# Patient Record
Sex: MALE | Race: BLACK OR AFRICAN AMERICAN | NOT HISPANIC OR LATINO
[De-identification: names, ages, dates, MRNs, and addresses within clinical notes are randomized per-mention and may not be internally consistent; named-entity substitution may affect disease eponyms.]

---

## 2021-02-05 PROBLEM — Z00.00 ENCOUNTER FOR PREVENTIVE HEALTH EXAMINATION: Status: ACTIVE | Noted: 2021-02-05

## 2021-03-18 ENCOUNTER — APPOINTMENT (OUTPATIENT)
Dept: PULMONOLOGY | Facility: CLINIC | Age: 56
End: 2021-03-18
Payer: COMMERCIAL

## 2021-03-18 DIAGNOSIS — Z86.79 PERSONAL HISTORY OF OTHER DISEASES OF THE CIRCULATORY SYSTEM: ICD-10-CM

## 2021-03-18 PROCEDURE — 99204 OFFICE O/P NEW MOD 45 MIN: CPT | Mod: GC,95

## 2021-03-18 NOTE — REASON FOR VISIT
[Initial Evaluation] : an initial evaluation [Home] : at home, [unfilled] , at the time of the visit. [Medical Office: ([facility])___] : at the medical office located in  [Verbal consent obtained from patient] : the patient, [unfilled]

## 2021-03-19 NOTE — HISTORY OF PRESENT ILLNESS
[FreeTextEntry1] : 55M hx HTN, DILCIA, who comes for initial evaluation. Referred by Dr. Vicente. Pt states that he was first diagnosed with sleep apnea 10 years ago and had snoring, EDS. He was told that he had severe sleep apnea (AHI 54/hr) and subsequently had CPAP titration study. Currently states that he uses CPAP consistently, has ResMed through [facility]. However even with consistent CPAP use, he feels sleepy during the day. 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 No hypersomnolence prior to diagnosis of DILCIA. \par  \par Meds: irbesartan\par  \par ESS: 21\par \par CPAP titration ([facility]) 1/25/2018: CPAP 11 cmH2O w/ nasal mask w/AHI 0.3

## 2021-03-19 NOTE — REVIEW OF SYSTEMS
[EDS: ESS=____] : daytime somnolence: ESS=[unfilled] [Fatigue] : fatigue [Snoring] : snoring [Difficulty Maintaining Sleep] : difficulty maintaining sleep [Negative] : Psychiatric [Witnessed Apneas] : no witnessed apnea [A.M. Headache] : no headache present upon awakening [Leg Dysesthesias] : no leg dysesthesias [Difficulty Initiating Sleep] : no difficulty falling asleep [Lower Extremity Discomfort] : no lower extremity discomfort [Late day/ Evening symptoms] : no late day/evening symptoms [Unusual Sleep Behavior] : no unusual sleep behavior [Hypersomnolence] : not sleeping much more than usual [Cataplexy] :  no cataplexy

## 2021-03-19 NOTE — ASSESSMENT
[FreeTextEntry1] : 55M hx HTN, DILCIA, who comes for initial evaluation. Pt has hx of DILCIA and treated with CPAP. Pt uses ResMed PAP machine at home, unsure of settings. Pt states that he is using CPAP nightly and continues to feel extremely sleepy during the day with ESS today of 21. [facility] to [facility] from PAP therapy. Will try to obtain old PSG. If pt is compliant and continues to still have EDS, may require additional studies to evaluate. \par \par \par Addendum:  pts cpap device now linked to [employer] - data available after patients visit\par usage 4 hr 56 min on average at cpap 11 cmH20 with therapy showed ahi at 0.4/hr.  mask leakage in acceptable range. will review with pt to increase sleep time as insufficient sleep time along with shift work sleep disorder are likley contributing factors to his persistent eds despite pap therapy.

## 2021-07-06 ENCOUNTER — APPOINTMENT (OUTPATIENT)
Dept: PULMONOLOGY | Facility: CLINIC | Age: 56
End: 2021-07-06
Payer: COMMERCIAL

## 2021-07-06 VITALS
WEIGHT: 244.5 LBS | SYSTOLIC BLOOD PRESSURE: 168 MMHG | TEMPERATURE: 98 F | OXYGEN SATURATION: 98 % | RESPIRATION RATE: 16 BRPM | DIASTOLIC BLOOD PRESSURE: 93 MMHG | HEART RATE: 64 BPM

## 2021-07-06 DIAGNOSIS — Z85.038 PERSONAL HISTORY OF OTHER MALIGNANT NEOPLASM OF LARGE INTESTINE: ICD-10-CM

## 2021-07-06 PROCEDURE — 99072 ADDL SUPL MATRL&STAF TM PHE: CPT

## 2021-07-06 PROCEDURE — 99214 OFFICE O/P EST MOD 30 MIN: CPT | Mod: GC

## 2021-07-06 NOTE — PHYSICAL EXAM
[General Appearance - Well Developed] : well developed [General Appearance - Well Nourished] : well nourished [Normal Conjunctiva] : the conjunctiva exhibited no abnormalities [Normal Oropharynx] : normal oropharynx [Neck Appearance] : the appearance of the neck was normal [Jugular Venous Distention Increased] : there was no jugular-venous distention [Heart Rate And Rhythm] : heart rate was normal and rhythm regular [] : no respiratory distress [Abdomen Soft] : soft [Abdomen Tenderness] : non-tender [Nail Clubbing] : no clubbing of the fingernails [Cyanosis, Localized] : no localized cyanosis [Oriented To Time, Place, And Person] : oriented to person, place, and time [Impaired Insight] : insight and judgment were intact [Mood] : the mood was normal

## 2021-07-06 NOTE — REVIEW OF SYSTEMS
[EDS: ESS=____] : daytime somnolence: ESS=[unfilled] [Fatigue] : fatigue [Snoring] : snoring [Difficulty Maintaining Sleep] : difficulty maintaining sleep [Negative] : Psychiatric [Witnessed Apneas] : no witnessed apnea [Leg Dysesthesias] : no leg dysesthesias [A.M. Headache] : no headache present upon awakening [Difficulty Initiating Sleep] : no difficulty falling asleep [Lower Extremity Discomfort] : no lower extremity discomfort [Late day/ Evening symptoms] : no late day/evening symptoms [Unusual Sleep Behavior] : no unusual sleep behavior [Hypersomnolence] : not sleeping much more than usual [Cataplexy] :  no cataplexy

## 2021-07-06 NOTE — HISTORY OF PRESENT ILLNESS
[FreeTextEntry1] : 56 yo M with DILCIA, HTN, colon CA (s/p resection in 2020, in remission(?) no chemo/rad), here for follow up for EDS. 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 He goes to bed from 10:00pm - 2:30 on average and has no issues with sleep onset or maintenance. He also reports consistent CPAP use during this time. He occasionally takes naps after his shift lasting 2-3 hours on his work days. On his days off, he sleeps ~8hours and feels refreshed most of the time.\par He drives to and from work and stated that he sometimes has bouts of sleepiness, and has to stop on the side to take short naps. \par No caffeine use, no alcohol, smoking, or illicit drug use.\par \par Meds: irbesartan\par  \par ESS: 16\par \par CPAP titration ([facility]) 1/25/2018: CPAP 11 cmH2O w/ nasal mask w/AHI 0.3

## 2021-07-06 NOTE — REASON FOR VISIT
[Initial Evaluation] : an initial evaluation [Home] : at home, [unfilled] , at the time of the visit. [Medical Office: ([facility])___] : at the medical office located in  [Verbal consent obtained from patient] : the patient, [unfilled]

## 2021-07-06 NOTE — ASSESSMENT
[FreeTextEntry1] : 55M hx HTN, Colon CA, DILCIA, who comes for follow up evaluation. CPAP usage shows adequate use every night, with residual AHI <1. Despite that, he still has EDS. This is likely due to accumulated sleep debt and ongoing insufficient sleep time. We have recommended to have a consistent bimodal sleep schedule (right after shift and at night) and aim for 6-7hrs. of sleep every day. \par He has bouts of sleepiness with driving, and is indicated for stimulants given his shift worker sleep disorder. However, this is limited by his uncontrolled hypertension (on repeat measurement, 182/98). We have recommended him to return to his PCP to have better control of his blood pressure. If his symptoms persist at that point, plan on starting modafinil or armodafinil during his work days. in the interim we have advised on behavioral measures to improve alertness such as scheduled naps and protecting sleep time after his shift.  he will also look into potential options to change his work schedule.  We have provided full counseling on dangers and precautions of driving with his condition. The dangers of drowsy driving were discussed with the patient.  The patient was warned to avoid drowsy driving. The patient is benefiting from PAP therapy and will continue nightly use as prescribed.

## 2022-08-17 ENCOUNTER — APPOINTMENT (OUTPATIENT)
Dept: PULMONOLOGY | Facility: CLINIC | Age: 57
End: 2022-08-17

## 2022-08-17 VITALS
OXYGEN SATURATION: 98 % | WEIGHT: 253.38 LBS | DIASTOLIC BLOOD PRESSURE: 84 MMHG | RESPIRATION RATE: 16 BRPM | HEART RATE: 73 BPM | TEMPERATURE: 98 F | SYSTOLIC BLOOD PRESSURE: 153 MMHG

## 2022-08-17 DIAGNOSIS — G47.33 OBSTRUCTIVE SLEEP APNEA (ADULT) (PEDIATRIC): ICD-10-CM

## 2022-08-17 DIAGNOSIS — G47.14 HYPERSOMNIA DUE TO MEDICAL CONDITION: ICD-10-CM

## 2022-08-17 DIAGNOSIS — G47.26 CIRCADIAN RHYTHM SLEEP DISORDER, SHIFT WORK TYPE: ICD-10-CM

## 2022-08-17 PROCEDURE — 99213 OFFICE O/P EST LOW 20 MIN: CPT | Mod: GC

## 2022-08-17 RX ORDER — IRBESARTAN 300 MG/1
TABLET ORAL
Refills: 0 | Status: COMPLETED | COMMUNITY
End: 2022-08-17

## 2022-08-17 RX ORDER — NIFEDIPINE 30 MG/1
30 TABLET, EXTENDED RELEASE ORAL
Refills: 0 | Status: ACTIVE | COMMUNITY
Start: 2022-08-17

## 2022-08-17 RX ORDER — ARMODAFINIL 150 MG/1
150 TABLET ORAL DAILY
Qty: 30 | Refills: 0 | Status: ACTIVE | COMMUNITY
Start: 2021-07-06 | End: 1900-01-01

## 2022-08-17 NOTE — REVIEW OF SYSTEMS
[EDS: ESS=____] : daytime somnolence: ESS=[unfilled] [Fatigue] : fatigue [Snoring] : snoring [Negative] : Psychiatric [Witnessed Apneas] : no witnessed apnea [A.M. Headache] : no headache present upon awakening [Leg Dysesthesias] : no leg dysesthesias [Difficulty Initiating Sleep] : no difficulty falling asleep [Lower Extremity Discomfort] : no lower extremity discomfort [Late day/ Evening symptoms] : no late day/evening symptoms [Unusual Sleep Behavior] : no unusual sleep behavior [Hypersomnolence] : not sleeping much more than usual [Cataplexy] :  no cataplexy

## 2022-08-17 NOTE — ASSESSMENT
[FreeTextEntry1] : 55M hx HTN, Colon CA, DILCIA, who comes for follow up evaluation. CPAP usage shows adequate use every night, with residual AHI <1. Despite that, he still has EDS. This is likely due to accumulated sleep debt and ongoing insufficient sleep time.\par \par 1) DILCIA with excessive daytime sleepiness due to inadequate sleep time and shift work disorder - He is using his machine nightly and continues to derive benefit from use. He will continue to use the machine on a nightly basis. His excessive sleepiness during the day is likely a result of inadequate sleep time during the work week (he averages about 5 hours during work weeks) which is likely related to his early shift. When he is allowed to sleep on the weekends, he will generally sleep 6-7 hours, and his bedtime is much later. He was advised to try and regularize his sleep schedule and to try and increase his total sleep time to at least 6 hours during the work week. He will be prescribed armodafinil for excessive sleepiness during the day, and was instructed to start with 1/2 pill for the first 3 days to minimize side effects prior to starting the full dose. We have also sent a prescription for a new CPAP machine as his current machine is >5 years old.\par \par He will follow up in several weeks to discuss how the medication is working for him.

## 2022-08-17 NOTE — HISTORY OF PRESENT ILLNESS
[FreeTextEntry1] : 56 yo M with DILCIA, HTN, colon CA (s/p resection in 2020 in remission, here for follow up. Last seen on 7/6/2021. At the time, he was complaining of excessive sleepiness and was prescribed armodafinil, however it was never filled.\par \par Patient reports he has been doing about the same since last visit. He never started armodafinil because he was supposed to follow up with his PCP regarding drug interactions with his blood pressure medication, however he admits that he never did. He still report that he feels very sleepy during the day. There are times during the day when he has to take naps in order to get through until nighttime. He says that his normal sleep hours during work days are 9 pm to 2 am. He generally gets to work around 4:45 am. When he sleeps, he reports he sleeps through the night without difficulty. On weekends, he reports he sleeps from 12-1 am to 7-8 am, and generally feels more refreshed throughout the day.\par \par He reports nightly usage of his CPAP device and is deriving benefit from its use. Also reports that he has recently switched his blood pressure medication from irbesartan to nifedipine. This was done because he was recently hospitalized with pancreatitis and it was felt that the irbesartan could have contributed to that.\par \par Meds: nifedipine 30 mg bid\par \par Machine: ResMed AirSense 10 AutoSet CPAP 11 cm H2O\par DME: [facility]\par \par PAP compliance: 26/30 days (87%) with an average nightly usage of 4 hours and 53 minutes, residual AHI 0.5/hr\par \par Sleep Studies:\par CPAP titration ([facility]) 1/25/2018: CPAP 11 cmH2O w/ nasal mask w/AHI 0.3

## 2022-08-18 ENCOUNTER — NON-APPOINTMENT (OUTPATIENT)
Age: 57
End: 2022-08-18

## 2024-07-25 ENCOUNTER — APPOINTMENT (OUTPATIENT)
Dept: PULMONOLOGY | Facility: CLINIC | Age: 59
End: 2024-07-25
Payer: COMMERCIAL

## 2024-07-25 VITALS
HEART RATE: 66 BPM | RESPIRATION RATE: 15 BRPM | BODY MASS INDEX: 36.4 KG/M2 | OXYGEN SATURATION: 95 % | SYSTOLIC BLOOD PRESSURE: 168 MMHG | HEIGHT: 71 IN | WEIGHT: 260 LBS | TEMPERATURE: 98.1 F | DIASTOLIC BLOOD PRESSURE: 91 MMHG

## 2024-07-25 DIAGNOSIS — R06.02 SHORTNESS OF BREATH: ICD-10-CM

## 2024-07-25 DIAGNOSIS — G47.33 OBSTRUCTIVE SLEEP APNEA (ADULT) (PEDIATRIC): ICD-10-CM

## 2024-07-25 PROCEDURE — G2211 COMPLEX E/M VISIT ADD ON: CPT | Mod: NC

## 2024-07-25 PROCEDURE — 99214 OFFICE O/P EST MOD 30 MIN: CPT | Mod: GC

## 2024-07-26 RX ORDER — ARMODAFINIL 150 MG/1
150 TABLET ORAL
Qty: 30 | Refills: 0 | Status: ACTIVE | COMMUNITY
Start: 2024-07-25 | End: 1900-01-01

## 2024-07-26 NOTE — HISTORY OF PRESENT ILLNESS
[Obstructive Sleep Apnea] : obstructive sleep apnea [FreeTextEntry1] : Mr. Soliman 58 HTN, colon cancer, class II obesity, severe DILCIA (AHI 52) who presents for follow up.     His last clinic visit was 8/17/22- concerned about excessive daytime sleepiness despite being compliant with CPAP and armodafinil was recommended. He did not initiate the medication and was lost to follow up for two years.  Today he presents because he is noticing worsening dyspnea on exertion over the past two years in addition to persistent excessive daytime sleepiness (ESS 18). Although he can climb a flight of stairs without stopping to breathe, he is often very winded and fatigued by the end. He endorses intermittent bilateral leg swelling. Otherwise denies associated cough, sputum production, chest pain, or any history of childhood asthma or prior smoking. Weight trend has demonstrated about 20 lb weight gain since his prior visit 2 years ago.   Compliance report from 6/25-7/24/24 demonstrates 87% compliance with 11cm H2O CPAP, residual AHI 0.9, average usage 5 hrs 52 min per night. Feels his sleep quality is good on the nights he uses CPAP. Has cessation of snoring and nighttime wakening with gasping. He sleeps about 6-7 hours per night from 12 AM- 7 AM and has a more stable work schedule from 12-7 PM. He sometimes feels refreshed after a night's rest but not all the time and usually has significant daytime sleepiness (ESS18) which is concerning to him.  He did not try the modafinil yet.   Lastly he is afraid of becoming dependent on CPAP and finds it cumbersome despite having better sleep quality and wanted to explore other options to treat DILCIA.   EPWORTH SLEEPINESS SCALE How likely are you to doze off or fall asleep in the situations described below, in contrast to feeling just tired? This refers to your usual way of life in recent times. Even if you haven't done some of these things recently, try to work out how they would have affected you. Use the following scale to choose one most appropriate number for each situation.   Chance of dozing.............Situation 3........................................Sitting and reading 1........................................Watching TV 2........................................Sitting inactive in a public place (eg a theatre or a meeting) 2........................................As a passenger in a car for an hour without a break 3........................................Lying down to rest in the afternoon when circumstances permit 2........................................Sitting and talking to someone 3........................................Sitting quietly after lunch without alcohol 2........................................In a car, while stopped for a few minutes in traffic   18........................................TOTAL SCORE   0 = Never would doze 1 = Slight chance of dozing 2 = Moderate chance of dozing 3 = High chance of dozing  0-10 normal 10-12 borderline 12-24 abnormal ___________________________________

## 2024-07-26 NOTE — ASSESSMENT
[FreeTextEntry1] : Mr. Soliman 58 HTN, colon cancer, class II obesity, severe DILCIA (AHI 52), excessive daytime somnolence, who presents for follow up. Compliance report from 6/25-7/24/24 demonstrates 87% compliance with 11cm H2O CPAP, residual AHI 0.9, with an average usage time of 5h52 min. He has noticed both subjective and objective improvement with PAP therapy and should continue to use it.   He expressed desire to stop using CPAP because he finds it cumbersome.  We discussed significant weight loss in a healthy manner with exercise, diet change, and medication which can be prescribed by his primary care doctor to achieve this goal. He is interested in trying a mandibular advancement device as an alternative mode of treatment with the understanding that it is unlikely to resolve his severe DILCIA. We will refer to dentistry and once he is fitted, perform an HST with and without the device to assess for effect.   Plan: Weight management referral Dentistry referral for evaluation of MAD HST after device is fitted to assess for effectiveness Initiate armodafinil 150 mg once daily for excessive daytime somnolence. Start with 1/2 tablet (75 mg) once daily and assess for effect. Advised to look out for side effects such as nervousness, anxiety, and worsened BP control. If well tolerated, can increase to 150 mg once daily as needed for sleepiness. Take only in the morning as needed. Advised to monitor BP closely at home and to be compliant with his antihypertensive regimen. PFT's and 2d echo ordered for further workup of dyspnea on exertion and bilateral lower extremity leg swelling

## 2024-10-07 ENCOUNTER — APPOINTMENT (OUTPATIENT)
Dept: PULMONOLOGY | Facility: CLINIC | Age: 59
End: 2024-10-07

## 2024-10-22 ENCOUNTER — APPOINTMENT (OUTPATIENT)
Dept: PULMONOLOGY | Facility: CLINIC | Age: 59
End: 2024-10-22
Payer: COMMERCIAL

## 2024-10-22 PROCEDURE — 94060 EVALUATION OF WHEEZING: CPT

## 2024-10-22 PROCEDURE — 94729 DIFFUSING CAPACITY: CPT

## 2024-10-22 PROCEDURE — 94726 PLETHYSMOGRAPHY LUNG VOLUMES: CPT

## 2024-10-23 ENCOUNTER — NON-APPOINTMENT (OUTPATIENT)
Age: 59
End: 2024-10-23

## 2025-01-21 ENCOUNTER — APPOINTMENT (OUTPATIENT)
Dept: CV DIAGNOSITCS | Facility: HOSPITAL | Age: 60
End: 2025-01-21